# Patient Record
Sex: FEMALE | Race: WHITE | Employment: OTHER | URBAN - METROPOLITAN AREA
[De-identification: names, ages, dates, MRNs, and addresses within clinical notes are randomized per-mention and may not be internally consistent; named-entity substitution may affect disease eponyms.]

---

## 2021-06-13 ENCOUNTER — APPOINTMENT (OUTPATIENT)
Dept: CT IMAGING | Facility: HOSPITAL | Age: 31
End: 2021-06-13
Attending: EMERGENCY MEDICINE
Payer: MEDICAID

## 2021-06-13 ENCOUNTER — HOSPITAL ENCOUNTER (OUTPATIENT)
Facility: HOSPITAL | Age: 31
Setting detail: OBSERVATION
Discharge: HOME | End: 2021-06-15
Attending: EMERGENCY MEDICINE | Admitting: INTERNAL MEDICINE
Payer: MEDICAID

## 2021-06-13 ENCOUNTER — APPOINTMENT (OUTPATIENT)
Dept: GENERAL RADIOLOGY | Facility: HOSPITAL | Age: 31
End: 2021-06-13
Attending: EMERGENCY MEDICINE
Payer: MEDICAID

## 2021-06-13 DIAGNOSIS — E28.2 PCOS (POLYCYSTIC OVARIAN SYNDROME): ICD-10-CM

## 2021-06-13 DIAGNOSIS — D64.9 NORMOCYTIC ANEMIA: ICD-10-CM

## 2021-06-13 DIAGNOSIS — N94.89 ADNEXAL MASS: ICD-10-CM

## 2021-06-13 DIAGNOSIS — R58 HEMORRHAGE, INTRA-ABDOMINAL: Primary | ICD-10-CM

## 2021-06-13 PROCEDURE — 81001 URINALYSIS AUTO W/SCOPE: CPT | Performed by: EMERGENCY MEDICINE

## 2021-06-13 PROCEDURE — 80048 BASIC METABOLIC PNL TOTAL CA: CPT | Performed by: EMERGENCY MEDICINE

## 2021-06-13 PROCEDURE — 71045 X-RAY EXAM CHEST 1 VIEW: CPT | Performed by: EMERGENCY MEDICINE

## 2021-06-13 PROCEDURE — 74177 CT ABD & PELVIS W/CONTRAST: CPT | Performed by: EMERGENCY MEDICINE

## 2021-06-13 PROCEDURE — 96375 TX/PRO/DX INJ NEW DRUG ADDON: CPT

## 2021-06-13 PROCEDURE — 83605 ASSAY OF LACTIC ACID: CPT | Performed by: EMERGENCY MEDICINE

## 2021-06-13 PROCEDURE — 85025 COMPLETE CBC W/AUTO DIFF WBC: CPT | Performed by: EMERGENCY MEDICINE

## 2021-06-13 PROCEDURE — 96374 THER/PROPH/DIAG INJ IV PUSH: CPT

## 2021-06-13 PROCEDURE — 84703 CHORIONIC GONADOTROPIN ASSAY: CPT | Performed by: EMERGENCY MEDICINE

## 2021-06-13 PROCEDURE — 96361 HYDRATE IV INFUSION ADD-ON: CPT

## 2021-06-13 PROCEDURE — 81025 URINE PREGNANCY TEST: CPT

## 2021-06-13 PROCEDURE — 80307 DRUG TEST PRSMV CHEM ANLYZR: CPT | Performed by: EMERGENCY MEDICINE

## 2021-06-13 PROCEDURE — 99285 EMERGENCY DEPT VISIT HI MDM: CPT

## 2021-06-13 PROCEDURE — 80076 HEPATIC FUNCTION PANEL: CPT | Performed by: EMERGENCY MEDICINE

## 2021-06-13 RX ORDER — KETOROLAC TROMETHAMINE 15 MG/ML
15 INJECTION, SOLUTION INTRAMUSCULAR; INTRAVENOUS ONCE
Status: COMPLETED | OUTPATIENT
Start: 2021-06-13 | End: 2021-06-13

## 2021-06-13 RX ORDER — MORPHINE SULFATE 4 MG/ML
2 INJECTION, SOLUTION INTRAMUSCULAR; INTRAVENOUS ONCE
Status: COMPLETED | OUTPATIENT
Start: 2021-06-13 | End: 2021-06-13

## 2021-06-14 ENCOUNTER — APPOINTMENT (OUTPATIENT)
Dept: ULTRASOUND IMAGING | Facility: HOSPITAL | Age: 31
End: 2021-06-14
Attending: EMERGENCY MEDICINE
Payer: MEDICAID

## 2021-06-14 PROBLEM — N94.89 ADNEXAL MASS: Status: ACTIVE | Noted: 2021-06-14

## 2021-06-14 PROBLEM — R58 HEMORRHAGE, INTRA-ABDOMINAL: Status: ACTIVE | Noted: 2021-06-14

## 2021-06-14 PROBLEM — E28.2 PCOS (POLYCYSTIC OVARIAN SYNDROME): Status: ACTIVE | Noted: 2021-06-14

## 2021-06-14 PROCEDURE — 83735 ASSAY OF MAGNESIUM: CPT | Performed by: INTERNAL MEDICINE

## 2021-06-14 PROCEDURE — 86901 BLOOD TYPING SEROLOGIC RH(D): CPT | Performed by: EMERGENCY MEDICINE

## 2021-06-14 PROCEDURE — 85018 HEMOGLOBIN: CPT | Performed by: NURSE PRACTITIONER

## 2021-06-14 PROCEDURE — 76856 US EXAM PELVIC COMPLETE: CPT | Performed by: EMERGENCY MEDICINE

## 2021-06-14 PROCEDURE — 85014 HEMATOCRIT: CPT | Performed by: EMERGENCY MEDICINE

## 2021-06-14 PROCEDURE — 94640 AIRWAY INHALATION TREATMENT: CPT

## 2021-06-14 PROCEDURE — 80048 BASIC METABOLIC PNL TOTAL CA: CPT | Performed by: INTERNAL MEDICINE

## 2021-06-14 PROCEDURE — 86900 BLOOD TYPING SEROLOGIC ABO: CPT | Performed by: EMERGENCY MEDICINE

## 2021-06-14 PROCEDURE — 76830 TRANSVAGINAL US NON-OB: CPT | Performed by: EMERGENCY MEDICINE

## 2021-06-14 PROCEDURE — 85610 PROTHROMBIN TIME: CPT | Performed by: INTERNAL MEDICINE

## 2021-06-14 PROCEDURE — 93975 VASCULAR STUDY: CPT | Performed by: EMERGENCY MEDICINE

## 2021-06-14 PROCEDURE — 86850 RBC ANTIBODY SCREEN: CPT | Performed by: EMERGENCY MEDICINE

## 2021-06-14 PROCEDURE — 85025 COMPLETE CBC W/AUTO DIFF WBC: CPT | Performed by: INTERNAL MEDICINE

## 2021-06-14 PROCEDURE — 85018 HEMOGLOBIN: CPT | Performed by: EMERGENCY MEDICINE

## 2021-06-14 RX ORDER — SODIUM PHOSPHATE, DIBASIC AND SODIUM PHOSPHATE, MONOBASIC 7; 19 G/133ML; G/133ML
1 ENEMA RECTAL ONCE AS NEEDED
Status: DISCONTINUED | OUTPATIENT
Start: 2021-06-14 | End: 2021-06-15

## 2021-06-14 RX ORDER — MORPHINE SULFATE 4 MG/ML
4 INJECTION, SOLUTION INTRAMUSCULAR; INTRAVENOUS EVERY 2 HOUR PRN
Status: DISCONTINUED | OUTPATIENT
Start: 2021-06-14 | End: 2021-06-14

## 2021-06-14 RX ORDER — DEXTROSE AND SODIUM CHLORIDE 5; .45 G/100ML; G/100ML
INJECTION, SOLUTION INTRAVENOUS CONTINUOUS
Status: ACTIVE | OUTPATIENT
Start: 2021-06-14 | End: 2021-06-14

## 2021-06-14 RX ORDER — DEXTROSE, SODIUM CHLORIDE, AND POTASSIUM CHLORIDE 5; .9; .15 G/100ML; G/100ML; G/100ML
INJECTION INTRAVENOUS CONTINUOUS
Status: DISCONTINUED | OUTPATIENT
Start: 2021-06-14 | End: 2021-06-14

## 2021-06-14 RX ORDER — ACETAMINOPHEN 325 MG/1
650 TABLET ORAL EVERY 6 HOURS PRN
Refills: 0 | Status: SHIPPED | COMMUNITY
Start: 2021-06-14

## 2021-06-14 RX ORDER — KETOROLAC TROMETHAMINE 15 MG/ML
15 INJECTION, SOLUTION INTRAMUSCULAR; INTRAVENOUS EVERY 6 HOURS PRN
Status: DISCONTINUED | OUTPATIENT
Start: 2021-06-14 | End: 2021-06-15

## 2021-06-14 RX ORDER — ALBUTEROL SULFATE 90 UG/1
2 AEROSOL, METERED RESPIRATORY (INHALATION) EVERY 6 HOURS PRN
COMMUNITY

## 2021-06-14 RX ORDER — ONDANSETRON 2 MG/ML
4 INJECTION INTRAMUSCULAR; INTRAVENOUS EVERY 6 HOURS PRN
Status: DISCONTINUED | OUTPATIENT
Start: 2021-06-14 | End: 2021-06-15

## 2021-06-14 RX ORDER — POLYETHYLENE GLYCOL 3350 17 G/17G
17 POWDER, FOR SOLUTION ORAL DAILY PRN
Status: DISCONTINUED | OUTPATIENT
Start: 2021-06-14 | End: 2021-06-15

## 2021-06-14 RX ORDER — ALBUTEROL SULFATE 90 UG/1
2 AEROSOL, METERED RESPIRATORY (INHALATION) EVERY 6 HOURS PRN
Status: DISCONTINUED | OUTPATIENT
Start: 2021-06-14 | End: 2021-06-15

## 2021-06-14 RX ORDER — MORPHINE SULFATE 2 MG/ML
1 INJECTION, SOLUTION INTRAMUSCULAR; INTRAVENOUS EVERY 2 HOUR PRN
Status: DISCONTINUED | OUTPATIENT
Start: 2021-06-14 | End: 2021-06-14

## 2021-06-14 RX ORDER — MELATONIN
325
Refills: 0 | Status: SHIPPED | COMMUNITY
Start: 2021-06-15

## 2021-06-14 RX ORDER — IBUPROFEN 400 MG/1
400 TABLET ORAL EVERY 8 HOURS PRN
Qty: 63 TABLET | Refills: 0 | Status: SHIPPED | COMMUNITY
Start: 2021-06-14

## 2021-06-14 RX ORDER — MORPHINE SULFATE 2 MG/ML
2 INJECTION, SOLUTION INTRAMUSCULAR; INTRAVENOUS EVERY 2 HOUR PRN
Status: DISCONTINUED | OUTPATIENT
Start: 2021-06-14 | End: 2021-06-14

## 2021-06-14 RX ORDER — PROCHLORPERAZINE EDISYLATE 5 MG/ML
5 INJECTION INTRAMUSCULAR; INTRAVENOUS EVERY 8 HOURS PRN
Status: DISCONTINUED | OUTPATIENT
Start: 2021-06-14 | End: 2021-06-15

## 2021-06-14 RX ORDER — BUDESONIDE AND FORMOTEROL FUMARATE DIHYDRATE 160; 4.5 UG/1; UG/1
2 AEROSOL RESPIRATORY (INHALATION) 2 TIMES DAILY
COMMUNITY

## 2021-06-14 RX ORDER — ACETAMINOPHEN 325 MG/1
650 TABLET ORAL EVERY 6 HOURS PRN
Status: DISCONTINUED | OUTPATIENT
Start: 2021-06-14 | End: 2021-06-15

## 2021-06-14 RX ORDER — MORPHINE SULFATE 2 MG/ML
2 INJECTION, SOLUTION INTRAMUSCULAR; INTRAVENOUS EVERY 4 HOURS PRN
Status: DISCONTINUED | OUTPATIENT
Start: 2021-06-14 | End: 2021-06-15

## 2021-06-14 RX ORDER — BISACODYL 10 MG
10 SUPPOSITORY, RECTAL RECTAL
Status: DISCONTINUED | OUTPATIENT
Start: 2021-06-14 | End: 2021-06-15

## 2021-06-14 RX ORDER — ACETAMINOPHEN 325 MG/1
650 TABLET ORAL EVERY 6 HOURS PRN
Status: DISCONTINUED | OUTPATIENT
Start: 2021-06-14 | End: 2021-06-14

## 2021-06-14 RX ORDER — MELATONIN
325
Status: DISCONTINUED | OUTPATIENT
Start: 2021-06-14 | End: 2021-06-15

## 2021-06-14 RX ORDER — HYDROCODONE BITARTRATE AND ACETAMINOPHEN 5; 325 MG/1; MG/1
1 TABLET ORAL EVERY 4 HOURS PRN
Status: DISCONTINUED | OUTPATIENT
Start: 2021-06-14 | End: 2021-06-15

## 2021-06-14 NOTE — ED PROVIDER NOTES
Patient Seen in: [facility] AND [facility] Emergency Department    History   Patient presents with:  Abdomen/Flank Pain    Stated Complaint: abdominal pain     HPI    20-year-old female with past medical history of PCOS not on medications and with history of irreg Resp 24   Temp 98.2 °F (36.8 °C)   Temp src Temporal   SpO2 100 %   O2 Device None (Room air)       Current:/73   Pulse 94   Temp 98.2 °F (36.8 °C) (Temporal)   Resp 22   Ht 167.6 cm (5' 6\")   Wt 61.2 kg   SpO2 100%   BMI 21.79 kg/m²         Physi Abnormality         Status                     ---------                               -----------         ------                     CBC W/ DIFFERENTIAL[[id number]]          Abnormal            Final result                 Please view results for these raul standard time. If you would like to discuss this case directly please call [phone] (extension [phone]). If you can not reach me at this number, do not leave a voicemail.  Please call the same number, Ext [phone] and ask for an available Radiologist.           Annabel Andrew at 1:16 AM [facility] time      Saravanan Ruiz M.D. This report has been electronically signed and verified by the Radiologist whose name is printed above.     DD:  06/13/2021/DT:  06/14/2021     This report contains privileged and confidential information an has been electronically signed and verified by the Radiologist whose name is printed above.     DD:  06/14/2021/DT:  06/14/2021            MDM     DIFFERENTIAL DIAGNOSIS: After history and physical exam differential diagnosis includes but is not limited to details. Disposition and Plan     Clinical Impression:  Hemorrhage, intra-abdominal  (primary encounter diagnosis)  Adnexal mass  PCOS (polycystic ovarian syndrome)    Disposition:  Admit    Follow-up:  No follow-up provider specified.     Medications Pr

## 2021-06-14 NOTE — DISCHARGE SUMMARY
[facility] Hospitalist Discharge Summary   Patient ID:  Susana Dumont  [id number]  79 year old  7/11/1990    Admit date: 6/13/2021  Discharge date: 6/15/2021    Primary Care Physician: Gwen Lundborg   Attending Physician: Sushil Collins DO   Consults:   Con x 1 month  -follow up CBC with gyn/pcp     Polysubstance Abuse  -drug screen benzo, cannabinoid, cocaine  -reinforced no drug use  -resources provided by psych liason     Elevated WBC  -afebrile.  WBC 14.6-->11.3  -no S/S infection   -UA negative  -CXR nega final report agrees with their preliminary findings.    Dictated by (CST): Prabha Oliver MD on 6/14/2021 at 11:07 AM     Finalized by (CST): Prabha Oliver MD on 6/14/2021 at 11:13 AM            Discharge Instructions     Medication List      START taking thes [address] Michael Kaufman  [phone]    Consider Yourself Counseling  KRISTAN Enrique [address], [address]  [phone]    [facility].  [address] [place] [address]  [phone]    Tot

## 2021-06-14 NOTE — H&P
[facility] Hospitalist Team  History and Physical  Admit Date:  6/13/21    ASSESSMENT / PLAN:   28 yo female with ? PCOS, asthma, anxiety, right arm injury and polysubstance use who presents with abdominal pain found to have hemorrhagic Ascites/cyst, see below for live with her father-in-law, her dog tearing his ACL and inability to conceive. She has been off birth control for the last year and has been undergoing work-up for potential PCOS.   She states that on Saturday was her 1 year anniversary and her  an status: Never Smoker      Smokeless tobacco: Never Used    Alcohol use: No       Fam Hx  Family History   Problem Relation Age of Onset   • Hypertension Father    • Breast Cancer Paternal Aunt    • Cancer Paternal Grandmother         rectal or colon cancer soft, nontender, nondistended, no organomegaly, bowel sounds present  MSK:  no clubbing, no cyanosis.   No Lower extremity edema  Skin: no rashes or lesions, well perfused  Psych: mood stable, cooperative  Neuro: no focal deficits    Assessment and Plan  He

## 2021-06-14 NOTE — ED QUICK NOTES
Pt is a 30y. o female who presents with lower abd pain after intercourse with  last night. No vaginal bleeding or discharge. Pt trying to get pregnant. LMP unknown as she is normally irregular. Pt with bilateral rib pain as well.  Denies falls or inju

## 2021-06-14 NOTE — BH PROGRESS NOTE
Behavioral Health Note:  REASON FOR ADMISSION:   Hemorrhagic ascites/cyst    OBJECTIVE:  Chastity Pittman is a  40-year-old female who presents due to Hemorrhagic ascites/cyst.  She has PMH significant for PCOS, asthma, ANNABELLA and anemia.  UDS+ for cannabis, coca years ago, hx outpatient psychiatry >10 years ago  Medication: hx celexa and xanax; no current medications    SOCIAL HISTORY:  Keith Correia is a  19-year-old female who currently lives in [facility] with her  and father-in-law.   She and her

## 2021-06-14 NOTE — ED QUICK NOTES
Orders for admission, patient is aware of plan and ready to go upstairs. Any questions, please call ED RN Jackqulyn Gitelman  at extension [phone].      Type of COVID test sent: rapid  COVID Suspicion level: negative  Drug(s) infusing: IVF  LOC at time of transport: a/

## 2021-06-14 NOTE — ED INITIAL ASSESSMENT (HPI)
Patient notes having intercourse last night and pain began in pelvic area. Patient notes pain is radiating to upper abdomen. Denies nausea/vomiting/diarrhea/fevers. Patient has current right shoulder injury from Thursday.

## 2021-06-14 NOTE — CONSULTS
[address] [phone]               Thank you for choosing us for your health care visit with Elis Prabhakar PT. We are glad to serve you and happy to provide you with this summary of your visit.   Please he [facility]    Consultation note    Preet Reina Patient Status:  Inpatient    1990 MRN [id number]   Location [facility] 4W/SW/SE Attending Tejas Salinas, [address] [facility] Day # 0 [facility]3]     Date of Admission:   OB with Marce Sagastume MD   [facility] OB/GYNE - [address] (Prashanth at [address] )    [address]  [address] [facility]    [phone]              Instructions and Information about Elian Keane constipation  : denies dysuria, denies incontinence, denies vaginal discharge                 denies itching  MUSCULOSKELETAL: denies back pain  NEURO: denies headaches, denies extremity weakness  PSYCHE: denies depression denies anxiety  HEMATOLOGIC: de follow-up OBGYN assessment. [facility] provided a preliminary report for this examination. This final report agrees with their preliminary findings.    Dictated by (CST): Yordan Kothari MD on 6/14/2021 at 11:07 AM     Finalized by (CST): Yordan Kothari,

## 2021-06-14 NOTE — PLAN OF CARE
Problem: Patient Centered Care  Goal: Patient preferences are identified and integrated in the patient's plan of care  Description: Interventions:  - What would you like us to know as we care for you? pt from home with   - Provide timely, complete, deficits and behaviors that affect risk of falls.   - [place] fall precautions as indicated by assessment.  - Educate pt/family on patient safety including physical limitations  - Instruct pt to call for assistance with activity based on assessment  - Mod

## 2021-06-15 VITALS
HEIGHT: 66 IN | WEIGHT: 135 LBS | SYSTOLIC BLOOD PRESSURE: 105 MMHG | RESPIRATION RATE: 18 BRPM | HEART RATE: 75 BPM | DIASTOLIC BLOOD PRESSURE: 71 MMHG | BODY MASS INDEX: 21.69 KG/M2 | OXYGEN SATURATION: 97 % | TEMPERATURE: 98 F

## 2021-06-15 PROCEDURE — 85025 COMPLETE CBC W/AUTO DIFF WBC: CPT | Performed by: NURSE PRACTITIONER

## 2021-06-15 PROCEDURE — 80048 BASIC METABOLIC PNL TOTAL CA: CPT | Performed by: NURSE PRACTITIONER

## 2021-06-15 NOTE — PLAN OF CARE
Discharge planning reviewed with [facility]. Patient states her abdominal pain has improved significantly. States it is \"just mild pain now. \" Peripheral IVs removed and all questions answered.    Problem: Patient Centered Care  Goal: Patient preferences are i Discharge     Problem: SAFETY ADULT - FALL  Goal: Free from fall injury  Description: INTERVENTIONS:  - Assess pt frequently for physical needs  - Identify cognitive and physical deficits and behaviors that affect risk of falls.   - [place] fall precautio

## 2021-06-15 NOTE — PLAN OF CARE
Pt rec'd sleeping in bed. Responds to verbal and tactile stimuli by opening eyes. Reports good relief of pain with prn toradol. Abdomen soft and nontender. No signs of bleeding noted. VSS. No acute distress noted. Gait steady.   Call light within eas

## 2021-06-15 NOTE — PAYOR COMM NOTE
--------------  ADMISSION REVIEW     Payor: RAPHAEL [address] #:  [id number]  Authorization Number: [id number]    Admit date: 6/14/21  Admit time:  2:34 AM       Admitting Physician: Anastasiia Milton MD  Attending Physician:  DO Lupillo Hewitt Smoking status: Never Smoker      Smokeless tobacco: Never Used    Alcohol use: No    Drug use: No      Review of Systems :  Constitutional: As per HPI  Gastrointestinal: (+) pelvic pain. Genitourinary: Negative for dysuria and hematuria.      Positive fo normal limits   CBC W/ DIFFERENTIAL - Abnormal; Notable for the following components:    WBC 14.6 (*)     RBC 3.08 (*)     HGB 9.5 (*)     HCT 28.9 (*)     Neutrophil Absolute Prelim 10.95 (*)     Neutrophil Absolute 10.95 (*)     All other components with spread to bilateral low ribs today.   Other Notes (entered by Technologist): ER Pod [phone]    Additional Information (per [facility]): History of asthma, low periumbilical pain that has spread to both lower ribs today    AP CHEST RADIOGRAPH Technologist):  pelvic/abdominal pain  Other Notes (entered by Technologist): uterus appears wnl  endom appears wnl  rt and lt ovary has numerous small peripheral follicles, likely PCOS, flow noted  heterogeneous avascular material throughout pelvis    Add Impression:  Hemorrhage, intra-abdominal  (primary encounter diagnosis)  Adnexal mass  PCOS (polycystic ovarian syndrome)    Disposition:  Admit       H&P - H&P Note      H&P signed by Denton Bowman DO at 6/14/2021  2:40 PM     Author: Denton Bowman use.    Patient states that over the last year she has been under significant stress related to inability to have her dream wedding, having to live with her father-in-law, her dog tearing his ACL and inability to conceive.   She has been off birth control f described by [facility].     Dictated by (CST): Irwin Patton MD on 6/14/2021 at 7:52 AM     Finalized by (CST): Irwin Patton MD on 6/14/2021 at 7:56 AM              SEE ATTENDING NOTE BELOW      Patient seen and examined independ 0502 Given 15 mg Intravenous Estefanía Mckeon RN    6/14/2021 1908 Given 15 mg Intravenous Ashley Feng, RN      ondansetron HCl [facility]) injection 4 mg     Date Action Dose Route User    6/15/2021 0455 Given 4 mg Intravenous Dimitri [place], [place]

## 2021-06-16 NOTE — PAYOR COMM NOTE
--------------  DISCHARGE REVIEW    Payor: [employer] #:  [id number]  Authorization Number: [id number]    Admit date: 6/14/21  Admit time:   2:34 AM  Discharge Date: 6/15/2021 12:15 PM     Admitting Physician: Constance Bryan MD  Attending Sushil Mackay to follow up with gyne as outpt and rec OCP for 3 months. . Pt with urine cx positive for cannabis, benzodiazapine and cocaine, reinforced no use with pregnancy attempts, pt seen by psych liaison and given resources, see below for details     Hemorrhagic As abnormality. [facility] provided a preliminary report for this examination. This final report agrees with their preliminary findings.    Dictated by (CST): Solomon Hale MD on 6/14/2021 at 9:50 AM     Finalized by (CST): Solomon Hale MD on 6/14/2021 possible for a visit in 3 days. Specialty: OBSTETRICS & GYNECOLOGY  Why: hospital follow up  Contact information:  [address] [address] [phone]             Usman Adams     Specialty: Internal Medicine  Why: hospital fo

## 2022-08-14 ENCOUNTER — APPOINTMENT (OUTPATIENT)
Dept: URGENT CARE | Age: 32
End: 2022-08-14

## 2022-08-14 ENCOUNTER — OFFICE VISIT (OUTPATIENT)
Dept: URGENT CARE | Age: 32
End: 2022-08-14

## 2022-08-14 VITALS
DIASTOLIC BLOOD PRESSURE: 78 MMHG | OXYGEN SATURATION: 96 % | HEIGHT: 66 IN | WEIGHT: 141 LBS | TEMPERATURE: 98.4 F | HEART RATE: 80 BPM | SYSTOLIC BLOOD PRESSURE: 100 MMHG | BODY MASS INDEX: 22.66 KG/M2

## 2022-08-14 DIAGNOSIS — Z02.1 PRE-EMPLOYMENT HEALTH SCREENING EXAMINATION: Primary | ICD-10-CM

## 2022-08-14 DIAGNOSIS — Z23 NEED FOR VACCINATION: ICD-10-CM

## 2022-08-14 PROCEDURE — X0945 SELF PAY APN OR PA PERFORMED ADMINISTRATIVE PHYSICAL: HCPCS | Performed by: NURSE PRACTITIONER

## 2022-08-14 PROCEDURE — 90715 TDAP VACCINE 7 YRS/> IM: CPT | Performed by: NURSE PRACTITIONER

## 2022-08-14 PROCEDURE — 90471 IMMUNIZATION ADMIN: CPT | Performed by: NURSE PRACTITIONER

## 2022-08-14 RX ORDER — BUDESONIDE AND FORMOTEROL FUMARATE DIHYDRATE 160; 4.5 UG/1; UG/1
AEROSOL RESPIRATORY (INHALATION)
COMMUNITY
Start: 2022-04-06

## 2022-08-14 RX ORDER — ALBUTEROL SULFATE 90 UG/1
AEROSOL, METERED RESPIRATORY (INHALATION)
COMMUNITY
Start: 2022-04-06

## 2022-08-14 ASSESSMENT — ENCOUNTER SYMPTOMS
HEMATOLOGIC/LYMPHATIC NEGATIVE: 1
ALLERGIC/IMMUNOLOGIC NEGATIVE: 1
NEUROLOGICAL NEGATIVE: 1
EYES NEGATIVE: 1
PSYCHIATRIC NEGATIVE: 1
GASTROINTESTINAL NEGATIVE: 1
CONSTITUTIONAL NEGATIVE: 1
RESPIRATORY NEGATIVE: 1